# Patient Record
Sex: FEMALE | Race: BLACK OR AFRICAN AMERICAN | URBAN - METROPOLITAN AREA
[De-identification: names, ages, dates, MRNs, and addresses within clinical notes are randomized per-mention and may not be internally consistent; named-entity substitution may affect disease eponyms.]

---

## 2024-07-30 ENCOUNTER — APPOINTMENT (OUTPATIENT)
Dept: LAB | Facility: MEDICAL CENTER | Age: 49
End: 2024-07-30

## 2024-07-30 ENCOUNTER — APPOINTMENT (OUTPATIENT)
Dept: URGENT CARE | Facility: MEDICAL CENTER | Age: 49
End: 2024-07-30

## 2024-07-30 DIAGNOSIS — Z02.1 PHYSICAL EXAM, PRE-EMPLOYMENT: ICD-10-CM

## 2024-07-30 LAB
MEV IGG SER QL IA: NORMAL
MUV IGG SER QL IA: NORMAL
RUBV IGG SERPL IA-ACNC: 25.5 IU/ML
VZV IGG SER QL IA: NORMAL

## 2024-07-30 PROCEDURE — 86787 VARICELLA-ZOSTER ANTIBODY: CPT

## 2024-07-30 PROCEDURE — 86765 RUBEOLA ANTIBODY: CPT

## 2024-07-30 PROCEDURE — 36415 COLL VENOUS BLD VENIPUNCTURE: CPT

## 2024-07-30 PROCEDURE — 86762 RUBELLA ANTIBODY: CPT

## 2024-07-30 PROCEDURE — 86735 MUMPS ANTIBODY: CPT

## 2024-12-24 ENCOUNTER — APPOINTMENT (OUTPATIENT)
Age: 49
End: 2024-12-24
Payer: COMMERCIAL

## 2024-12-24 ENCOUNTER — RESULTS FOLLOW-UP (OUTPATIENT)
Age: 49
End: 2024-12-24

## 2024-12-24 ENCOUNTER — OFFICE VISIT (OUTPATIENT)
Age: 49
End: 2024-12-24
Payer: COMMERCIAL

## 2024-12-24 ENCOUNTER — TELEPHONE (OUTPATIENT)
Dept: ADMINISTRATIVE | Facility: OTHER | Age: 49
End: 2024-12-24

## 2024-12-24 VITALS
OXYGEN SATURATION: 98 % | TEMPERATURE: 97.8 F | HEART RATE: 78 BPM | HEIGHT: 65 IN | DIASTOLIC BLOOD PRESSURE: 80 MMHG | WEIGHT: 262.8 LBS | BODY MASS INDEX: 43.78 KG/M2 | RESPIRATION RATE: 16 BRPM | SYSTOLIC BLOOD PRESSURE: 130 MMHG

## 2024-12-24 DIAGNOSIS — Z13.6 SCREENING FOR CARDIOVASCULAR CONDITION: ICD-10-CM

## 2024-12-24 DIAGNOSIS — R07.89 CHEST TIGHTNESS: ICD-10-CM

## 2024-12-24 DIAGNOSIS — Z00.00 ANNUAL PHYSICAL EXAM: ICD-10-CM

## 2024-12-24 DIAGNOSIS — Z12.11 SCREENING FOR COLORECTAL CANCER: ICD-10-CM

## 2024-12-24 DIAGNOSIS — Z12.4 SCREENING FOR CERVICAL CANCER: ICD-10-CM

## 2024-12-24 DIAGNOSIS — R51.9 NONINTRACTABLE EPISODIC HEADACHE, UNSPECIFIED HEADACHE TYPE: ICD-10-CM

## 2024-12-24 DIAGNOSIS — R42 EPISODIC LIGHTHEADEDNESS: ICD-10-CM

## 2024-12-24 DIAGNOSIS — Z12.31 ENCOUNTER FOR SCREENING MAMMOGRAM FOR BREAST CANCER: ICD-10-CM

## 2024-12-24 DIAGNOSIS — Z12.12 SCREENING FOR COLORECTAL CANCER: ICD-10-CM

## 2024-12-24 DIAGNOSIS — Z00.00 ANNUAL PHYSICAL EXAM: Primary | ICD-10-CM

## 2024-12-24 PROBLEM — D57.3 SICKLE CELL TRAIT (HCC): Status: ACTIVE | Noted: 2024-12-24

## 2024-12-24 PROBLEM — D57.3 SICKLE CELL TRAIT (HCC): Status: RESOLVED | Noted: 2024-12-24 | Resolved: 2024-12-24

## 2024-12-24 LAB
ALBUMIN SERPL BCG-MCNC: 4.5 G/DL (ref 3.5–5)
ALP SERPL-CCNC: 42 U/L (ref 34–104)
ALT SERPL W P-5'-P-CCNC: 14 U/L (ref 7–52)
ANION GAP SERPL CALCULATED.3IONS-SCNC: 9 MMOL/L (ref 4–13)
AST SERPL W P-5'-P-CCNC: 17 U/L (ref 13–39)
BASOPHILS # BLD AUTO: 0.04 THOUSANDS/ÂΜL (ref 0–0.1)
BASOPHILS NFR BLD AUTO: 1 % (ref 0–1)
BILIRUB SERPL-MCNC: 0.27 MG/DL (ref 0.2–1)
BUN SERPL-MCNC: 15 MG/DL (ref 5–25)
CALCIUM SERPL-MCNC: 9.7 MG/DL (ref 8.4–10.2)
CHLORIDE SERPL-SCNC: 99 MMOL/L (ref 96–108)
CHOLEST SERPL-MCNC: 179 MG/DL (ref ?–200)
CO2 SERPL-SCNC: 28 MMOL/L (ref 21–32)
CREAT SERPL-MCNC: 0.71 MG/DL (ref 0.6–1.3)
EOSINOPHIL # BLD AUTO: 0.18 THOUSAND/ÂΜL (ref 0–0.61)
EOSINOPHIL NFR BLD AUTO: 4 % (ref 0–6)
ERYTHROCYTE [DISTWIDTH] IN BLOOD BY AUTOMATED COUNT: 16.1 % (ref 11.6–15.1)
FERRITIN SERPL-MCNC: 13 NG/ML (ref 11–307)
GFR SERPL CREATININE-BSD FRML MDRD: 101 ML/MIN/1.73SQ M
GLUCOSE P FAST SERPL-MCNC: 99 MG/DL (ref 65–99)
HCT VFR BLD AUTO: 37.6 % (ref 34.8–46.1)
HDLC SERPL-MCNC: 49 MG/DL
HGB BLD-MCNC: 12.1 G/DL (ref 11.5–15.4)
IMM GRANULOCYTES # BLD AUTO: 0.01 THOUSAND/UL (ref 0–0.2)
IMM GRANULOCYTES NFR BLD AUTO: 0 % (ref 0–2)
IRON SATN MFR SERPL: 12 % (ref 15–50)
IRON SERPL-MCNC: 60 UG/DL (ref 50–212)
LDLC SERPL CALC-MCNC: 103 MG/DL (ref 0–100)
LYMPHOCYTES # BLD AUTO: 2.3 THOUSANDS/ÂΜL (ref 0.6–4.47)
LYMPHOCYTES NFR BLD AUTO: 46 % (ref 14–44)
MCH RBC QN AUTO: 24.7 PG (ref 26.8–34.3)
MCHC RBC AUTO-ENTMCNC: 32.2 G/DL (ref 31.4–37.4)
MCV RBC AUTO: 77 FL (ref 82–98)
MONOCYTES # BLD AUTO: 0.49 THOUSAND/ÂΜL (ref 0.17–1.22)
MONOCYTES NFR BLD AUTO: 10 % (ref 4–12)
NEUTROPHILS # BLD AUTO: 1.94 THOUSANDS/ÂΜL (ref 1.85–7.62)
NEUTS SEG NFR BLD AUTO: 39 % (ref 43–75)
NRBC BLD AUTO-RTO: 0 /100 WBCS
PLATELET # BLD AUTO: 236 THOUSANDS/UL (ref 149–390)
PMV BLD AUTO: 11.6 FL (ref 8.9–12.7)
POTASSIUM SERPL-SCNC: 3.9 MMOL/L (ref 3.5–5.3)
PROT SERPL-MCNC: 7.6 G/DL (ref 6.4–8.4)
RBC # BLD AUTO: 4.89 MILLION/UL (ref 3.81–5.12)
SODIUM SERPL-SCNC: 136 MMOL/L (ref 135–147)
T4 FREE SERPL-MCNC: 0.85 NG/DL (ref 0.61–1.12)
TIBC SERPL-MCNC: 483 UG/DL (ref 250–450)
TRANSFERRIN SERPL-MCNC: 345 MG/DL (ref 203–362)
TRIGL SERPL-MCNC: 137 MG/DL (ref ?–150)
TSH SERPL DL<=0.05 MIU/L-ACNC: 2.61 UIU/ML (ref 0.45–4.5)
UIBC SERPL-MCNC: 423 UG/DL (ref 155–355)
WBC # BLD AUTO: 4.96 THOUSAND/UL (ref 4.31–10.16)

## 2024-12-24 PROCEDURE — 83036 HEMOGLOBIN GLYCOSYLATED A1C: CPT

## 2024-12-24 PROCEDURE — 83540 ASSAY OF IRON: CPT

## 2024-12-24 PROCEDURE — 82728 ASSAY OF FERRITIN: CPT

## 2024-12-24 PROCEDURE — 85025 COMPLETE CBC W/AUTO DIFF WBC: CPT

## 2024-12-24 PROCEDURE — 83550 IRON BINDING TEST: CPT

## 2024-12-24 PROCEDURE — 99386 PREV VISIT NEW AGE 40-64: CPT

## 2024-12-24 PROCEDURE — 84439 ASSAY OF FREE THYROXINE: CPT

## 2024-12-24 PROCEDURE — 99214 OFFICE O/P EST MOD 30 MIN: CPT

## 2024-12-24 PROCEDURE — 36415 COLL VENOUS BLD VENIPUNCTURE: CPT

## 2024-12-24 PROCEDURE — 71046 X-RAY EXAM CHEST 2 VIEWS: CPT

## 2024-12-24 PROCEDURE — 84443 ASSAY THYROID STIM HORMONE: CPT

## 2024-12-24 PROCEDURE — 80061 LIPID PANEL: CPT

## 2024-12-24 PROCEDURE — 93000 ELECTROCARDIOGRAM COMPLETE: CPT

## 2024-12-24 PROCEDURE — 80053 COMPREHEN METABOLIC PANEL: CPT

## 2024-12-24 NOTE — PROGRESS NOTES
Adult Annual Physical  Name: Julia Dunn      : 1975      MRN: [id number]  Encounter Provider: Bettina Lundberg PA-C  Encounter Date: 2024   Encounter department: [facility] [place]    Assessment & Plan  Annual physical exam    Annual physical exam completed today.  - Medical history reviewed, including existing medical conditions, medications, and surgeries.   - Labs discussed to evaluate cholesterol, blood sugar, kidney function, liver function, and other important markers of health.  - BMI evaluated and discussed.  - Cancer screenings discussed: Mammogram/pap smear/colonoscopy.   - Vaccination status reviewed and pertinent immunizations and booster shots discussed.   - Bone health reviewed.   - Skin examination.  Discussed importance of sunscreen and other preventative measures for skin cancer.    - Lifestyle and health counseling completed including diet, exercise habits, smoking status, alcohol consumption.   - Mental health and wellbeing evaluated and discussed.  - Family history obtained to identify any of hereditary health risks.     Orders:  •  CBC and differential; Future  •  Comprehensive metabolic panel; Future    Chest tightness  Patient has had symptoms for the past couple weeks, denies any radiation of the pain, shortness of breath, or lightheadedness with the episodes.  No history of asthma or other respiratory illnesses.  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    EKG today shows sinus arrhythmia, non specific ST wave abnormality, and PVC.    Will check a TSH/T4 along with getting a chest x-ray to rule out respiratory cause.  Patient can use NSAIDs for the pain, low suspicion for GERD as it is not associated with eating.  Orders:  •  POCT ECG  •  XR chest pa and lateral; Future  •  TSH + Free T4; Future    Nonintractable episodic headache,  unspecified headache type  Headaches sound tension in nature, do not happen frequently.  Neuro exam WNL today.  Would recommend continuing to use Motrin 600 mg as needed.  Make sure she is eating regular meals and getting adequate sleep/rest.  She works overnights.  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       Episodic lightheadedness  Associated with her headaches.  It improves when she has something to eat or drink.  Possibly due to low blood sugar.  Will check an iron panel and A1c to rule out anemia and diabetes.  Orders:  •  Hemoglobin A1C; Future  •  Iron Panel (Includes Ferritin, Iron Sat%, Iron, and TIBC); Future    Encounter for screening mammogram for breast cancer  Not having any symptoms.  Will get a baseline mammogram.  Orders:  •  Mammo screening bilateral w 3d and cad; Future    Screening for cervical cancer  Needs establish with GYN.  Has a IUD placed a couple years ago.  Orders:  •  Ambulatory referral to Obstetrics / Gynecology; Future    Screening for colorectal cancer  Is overdue for colon cancer screening.  Patient agreeable for colonoscopy.  Orders:  •  Ambulatory referral to Gastroenterology; Future    Screening for cardiovascular condition  Will obtain a lipid panel to assess her cardiovascular risk.  She is a non-smoker, no significant family history.  Orders:  •  Lipid Panel with Direct LDL reflex; Future    Immunizations and preventive care screenings were discussed with patient today. Appropriate education was printed on patient's after visit summary.    Counseling:  Alcohol/drug use: discussed moderation in alcohol intake, the recommendations for healthy alcohol use, and avoidance of illicit drug use.  Dental Health: discussed importance of regular tooth brushing, flossing, and dental visits.  Injury prevention: discussed safety/seat belts, safety helmets, smoke detectors, carbon monoxide detectors, and smoking near bedding or  upholstery.  Sexual health: discussed sexually transmitted diseases, partner selection, use of condoms, avoidance of unintended pregnancy, and contraceptive alternatives.  Exercise: the importance of regular exercise/physical activity was discussed. Recommend exercise 3-5 times per week for at least 30 minutes.       Depression Screening and Follow-up Plan: Patient was screened for depression during today's encounter. They screened negative with a PHQ-2 score of 0.        History of Present Illness     Adult Annual Physical:  Patient presents for annual physical.     Diet and Physical Activity:  - Diet/Nutrition: well balanced diet.  - Exercise: no formal exercise.    Depression Screening:  - PHQ-2 Score: 0    General Health:  - Sleep: sleeps well.  - Hearing: normal hearing bilateral ears.  - Vision: no vision problems.  - Dental: regular dental visits.    /GYN Health:  - Follows with GYN: yes.   - Menopause: premenopausal.   - History of STDs: no  - Contraception: IUD placement. July 2022 IUD placed, believes it is copper      Advanced Care Planning:  - Has an advanced directive?: no    - Has a durable medical POA?: no    - ACP document given to patient?: no        Pt is here for annual physical, establish care with this office, and a couple of concerns.    PT has been having tightness in her chest.  She was just walking at the time.  She denies trauma.  Started about 2 weeks ago.  She does have some pain when she pushes on her upper chest.  Denies palpitations, SOB, GERD, jaw or arm pain.  Denies recent viral illness.  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  No hx asthma, smoking or 2nd hand smoke, or hx of respiratory illness.  She did have a cough of unknown cause when she lived in [place] and everything was negative.  She had a TB test in July for employment with [employer] and this was negative.    Pt reports increased headaches, but states it is less than once a week.  She uses motrin which  "helps.  She hasn't been sleeping well recently, but she is also trying to hydrate more.  She feels a little LHN with improvement after eating or drinking something.  She went to the ER in April 2023 for the headaches, had a CT scan which was negative, and was diagnosed with tension headaches.    She had a vision exam recently.  Needs to establish with GYN.  Has IUD, she believes she has a copper one because she was told to change it in ten years.  Wants to have colonoscopy done for screening.  Reviewed family history.    Review of Systems   Constitutional: Negative.    HENT: Negative.     Respiratory:  Negative for cough and shortness of breath.    Cardiovascular:  Negative for chest pain.   Gastrointestinal: Negative.    Genitourinary: Negative.    Musculoskeletal:  Negative for gait problem.   Skin:  Negative for rash.   Neurological:  Positive for headaches. Negative for syncope and light-headedness.   All other systems reviewed and are negative.        Objective   /80 (BP Location: Left arm, Patient Position: Sitting, Cuff Size: Standard)   Pulse 78   Temp 97.8 °F (36.6 °C) (Tympanic)   Resp 16   Ht 5' 5.25\" (1.657 m)   Wt 119 kg (262 lb 12.8 oz)   SpO2 98%   BMI 43.40 kg/m²     Physical Exam  Constitutional:       General: She is not in acute distress.     Appearance: Normal appearance. She is morbidly obese. She is not toxic-appearing.   HENT:      Head: Normocephalic and atraumatic.      Right Ear: Hearing, tympanic membrane, ear canal and external ear normal.      Left Ear: Hearing, tympanic membrane, ear canal and external ear normal.      Nose: Nose normal.      Mouth/Throat:      Lips: Pink.      Mouth: Mucous membranes are moist. No oral lesions.      Dentition: Normal dentition.      Pharynx: Oropharynx is clear.   Eyes:      General: Lids are normal. Vision grossly intact. No visual field deficit or scleral icterus.     Extraocular Movements: Extraocular movements intact.      " Conjunctiva/sclera: Conjunctivae normal.      Pupils: Pupils are equal, round, and reactive to light.      Visual Fields:      Right eye: CF in the upper temporal quadrant. CF in the upper nasal quadrant. CF in the lower temporal quadrant. CF in the lower nasal quadrant.      Left eye: CF in the upper nasal quadrant. CF in the upper temporal quadrant. CF in the lower nasal quadrant. CF in the lower temporal quadrant.   Neck:      Thyroid: No thyroid mass, thyromegaly or thyroid tenderness.      Vascular: No carotid bruit.      Meningeal: Brudzinski's sign and Kernig's sign absent.   Cardiovascular:      Rate and Rhythm: Normal rate and regular rhythm.      Pulses:           Radial pulses are 2+ on the right side and 2+ on the left side.        Posterior tibial pulses are 2+ on the right side and 2+ on the left side.      Heart sounds: Normal heart sounds.   Pulmonary:      Effort: Pulmonary effort is normal. No respiratory distress.      Breath sounds: Normal breath sounds.   Abdominal:      General: Bowel sounds are normal. There is no distension.      Palpations: Abdomen is soft.      Tenderness: There is no abdominal tenderness.   Musculoskeletal:      Cervical back: Normal range of motion and neck supple.      Right lower leg: No edema.      Left lower leg: No edema.      Comments: Patient with erect posture and good balance with normal gait while walking.  Joints and muscles are symmetrical no swelling, redness, or deformity.  Patient has active range of motion of all joints.     Lymphadenopathy:      Cervical: No cervical adenopathy.   Skin:     General: Skin is warm and dry.      Coloration: Skin is not jaundiced.   Neurological:      Mental Status: She is alert and oriented to person, place, and time.      GCS: GCS eye subscore is 4. GCS verbal subscore is 5. GCS motor subscore is 6.      Cranial Nerves: Cranial nerves 2-12 are intact. No cranial nerve deficit, dysarthria or facial asymmetry.      Sensory:  No sensory deficit.      Motor: No weakness or tremor.      Coordination: Coordination normal. Finger-Nose-Finger Test and Heel to Shin Test normal.      Gait: Gait is intact.   Psychiatric:         Behavior: Behavior is cooperative.

## 2024-12-24 NOTE — TELEPHONE ENCOUNTER
----- Message from Bettina Lundberg PA-C sent at 12/24/2024  9:02 AM EST -----  Regarding: care gap request  12/24/24 9:02 AM    Hello, our patient attached above has had Immunization(s) completed/performed. Please assist in updating the patient chart by making an External outreach to [facility] facility during her preemployment physical.  Pt believes she had her TDaP done at that time. The date of service is July 2024.    Thank you,  Bettina Lundberg PA-C   PRIMARY CARE [place]

## 2024-12-24 NOTE — PATIENT INSTRUCTIONS
"Patient Education     Routine physical for adults   The Basics   Written by the doctors and editors at [facility]   What is a physical? -- A physical is a routine visit, or \"check-up,\" with your doctor. You might also hear it called a \"wellness visit\" or \"preventive visit.\"  During each visit, the doctor will:   Ask about your physical and mental health   Ask about your habits, behaviors, and lifestyle   Do an exam   Give you vaccines if needed   Talk to you about any medicines you take   Give advice about your health   Answer your questions  Getting regular check-ups is an important part of taking care of your health. It can help your doctor find and treat any problems you have. But it's also important for preventing health problems.  A routine physical is different from a \"sick visit.\" A sick visit is when you see a doctor because of a health concern or problem. Since physicals are scheduled ahead of time, you can think about what you want to ask the doctor.  How often should I get a physical? -- It depends on your age and health. In general, for people age 21 years and older:   If you are younger than 50 years, you might be able to get a physical every 3 years.   If you are 50 years or older, your doctor might recommend a physical every year.  If you have an ongoing health condition, like diabetes or high blood pressure, your doctor will probably want to see you more often.  What happens during a physical? -- In general, each visit will include:   Physical exam - The doctor or nurse will check your height, weight, heart rate, and blood pressure. They will also look at your eyes and ears. They will ask about how you are feeling and whether you have any symptoms that bother you.   Medicines - It's a good idea to bring a list of all the medicines you take to each doctor visit. Your doctor will talk to you about your medicines and answer any questions. Tell them if you are having any side effects that bother you. You " "should also tell them if you are having trouble paying for any of your medicines.   Habits and behaviors - This includes:   Your diet   Your exercise habits   Whether you smoke, drink alcohol, or use drugs   Whether you are sexually active   Whether you feel safe at home  Your doctor will talk to you about things you can do to improve your health and lower your risk of health problems. They will also offer help and support. For example, if you want to quit smoking, they can give you advice and might prescribe medicines. If you want to improve your diet or get more physical activity, they can help you with this, too.   Lab tests, if needed - The tests you get will depend on your age and situation. For example, your doctor might want to check your:   Cholesterol   Blood sugar   Iron level   Vaccines - The recommended vaccines will depend on your age, health, and what vaccines you already had. Vaccines are very important because they can prevent certain serious or deadly infections.   Discussion of screening - \"Screening\" means checking for diseases or other health problems before they cause symptoms. Your doctor can recommend screening based on your age, risk, and preferences. This might include tests to check for:   Cancer, such as breast, prostate, cervical, ovarian, colorectal, prostate, lung, or skin cancer   Sexually transmitted infections, such as chlamydia and gonorrhea   Mental health conditions like depression and anxiety  Your doctor will talk to you about the different types of screening tests. They can help you decide which screenings to have. They can also explain what the results might mean.   Answering questions - The physical is a good time to ask the doctor or nurse questions about your health. If needed, they can refer you to other doctors or specialists, too.  Adults older than 65 years often need other care, too. As you get older, your doctor will talk to you about:   How to prevent falling at " home   Hearing or vision tests   Memory testing   How to take your medicines safely   Making sure that you have the help and support you need at home  All topics are updated as new evidence becomes available and our peer review process is complete.  This topic retrieved from [employer] on: May 02, 2024.  Topic 222351 Version 1.0  Release: 32.4.3 - C32.122  © 2024 UpToDate, Inc. and/or its affiliates. All rights reserved.  Consumer Information Use and Disclaimer   Disclaimer: This generalized information is a limited summary of diagnosis, treatment, and/or medication information. It is not meant to be comprehensive and should be used as a tool to help the user understand and/or assess potential diagnostic and treatment options. It does NOT include all information about conditions, treatments, medications, side effects, or risks that may apply to a specific patient. It is not intended to be medical advice or a substitute for the medical advice, diagnosis, or treatment of a health care provider based on the health care provider's examination and assessment of a patient's specific and unique circumstances. Patients must speak with a health care provider for complete information about their health, medical questions, and treatment options, including any risks or benefits regarding use of medications. This information does not endorse any treatments or medications as safe, effective, or approved for treating a specific patient. UpToDate, Inc. and its affiliates disclaim any warranty or liability relating to this information or the use thereof.The use of this information is governed by the Terms of Use, available at https://www.[employer]olor Technologiesuwer.com/en/know/clinical-effectiveness-terms. 2024© UpToDate, Inc. and its affiliates and/or licensors. All rights reserved.  Copyright   © 2024 UpToDate, Inc. and/or its affiliates. All rights reserved.

## 2024-12-25 LAB
EST. AVERAGE GLUCOSE BLD GHB EST-MCNC: 128 MG/DL
HBA1C MFR BLD: 6.1 %

## 2025-01-02 ENCOUNTER — TELEPHONE (OUTPATIENT)
Age: 50
End: 2025-01-02

## 2025-01-02 ENCOUNTER — PREP FOR PROCEDURE (OUTPATIENT)
Age: 50
End: 2025-01-02

## 2025-01-02 DIAGNOSIS — Z12.11 SCREENING FOR COLON CANCER: Primary | ICD-10-CM

## 2025-01-02 NOTE — TELEPHONE ENCOUNTER
Scheduled date of colonoscopy (as of today):  1/16/25    Physician performing colonoscopy: Dr. Arreola    Location of colonoscopy:  [place]    Bowel prep reviewed with patient: MITCHELL/ROSA Nash

## 2025-01-02 NOTE — TELEPHONE ENCOUNTER
01/02/25  Screened by: Maryann Jimenez    Referring Provider Bettina Lundberg PA-C    Pre- Screening:     There is no height or weight on file to calculate BMI.  43.40  Has patient been referred for a routine screening Colonoscopy? yes  Is the patient between 45-75 years old? yes      Previous Colonoscopy no   If yes:    Date:     Facility:     Reason:         Does the patient want to see a Gastroenterologist prior to their procedure OR are they having any GI symptoms? no    Has the patient been hospitalized or had abdominal surgery in the past 6 months? no    Does the patient use supplemental oxygen? no    Does the patient take Coumadin, Lovenox, Plavix, Elliquis, Xarelto, or other blood thinning medication? no    Has the patient had a stroke, cardiac event, or stent placed in the past year? no      If patient is between 45yrs - 49yrs, please advise patient that we will have to confirm benefits & coverage with their insurance company for a routine screening colonoscopy.

## 2025-01-14 ENCOUNTER — PREP FOR PROCEDURE (OUTPATIENT)
Age: 50
End: 2025-01-14

## 2025-01-14 ENCOUNTER — HOSPITAL ENCOUNTER (OUTPATIENT)
Age: 50
Discharge: HOME | End: 2025-01-14
Payer: COMMERCIAL

## 2025-01-14 VITALS — HEIGHT: 65 IN | BODY MASS INDEX: 43.65 KG/M2 | WEIGHT: 262 LBS

## 2025-01-14 DIAGNOSIS — Z12.31 ENCOUNTER FOR SCREENING MAMMOGRAM FOR BREAST CANCER: ICD-10-CM

## 2025-01-14 PROCEDURE — 77067 SCR MAMMO BI INCL CAD: CPT

## 2025-01-14 PROCEDURE — 77063 BREAST TOMOSYNTHESIS BI: CPT

## 2025-01-14 NOTE — TELEPHONE ENCOUNTER
Upon review of the In Basket request we have noted this is a [employer] Employee. [facility] Employee Health Department will not release Employee Records to anyone but the Employee/Patient. The immunization record(s) requested will need to be requested and provided by the Employee/Patient. Once received the office is able to complete the update by following the historical immunization workflow.     Any additional questions or concerns should be emailed to the Practice Liaisons via the appropriate education email address, please do not reply via In Basket.    Thank you  Lyle Blair MA   [facility]

## 2025-01-16 ENCOUNTER — HOSPITAL ENCOUNTER (OUTPATIENT)
Dept: GASTROENTEROLOGY | Facility: HOSPITAL | Age: 50
Setting detail: OUTPATIENT SURGERY
End: 2025-01-16
Attending: STUDENT IN AN ORGANIZED HEALTH CARE EDUCATION/TRAINING PROGRAM
Payer: COMMERCIAL

## 2025-01-16 ENCOUNTER — ANESTHESIA (OUTPATIENT)
Dept: GASTROENTEROLOGY | Facility: HOSPITAL | Age: 50
End: 2025-01-16
Payer: COMMERCIAL

## 2025-01-16 ENCOUNTER — RESULTS FOLLOW-UP (OUTPATIENT)
Age: 50
End: 2025-01-16

## 2025-01-16 ENCOUNTER — ANESTHESIA EVENT (OUTPATIENT)
Dept: GASTROENTEROLOGY | Facility: HOSPITAL | Age: 50
End: 2025-01-16
Payer: COMMERCIAL

## 2025-01-16 VITALS
BODY MASS INDEX: 41.81 KG/M2 | WEIGHT: 260.14 LBS | DIASTOLIC BLOOD PRESSURE: 56 MMHG | SYSTOLIC BLOOD PRESSURE: 122 MMHG | RESPIRATION RATE: 16 BRPM | HEART RATE: 73 BPM | OXYGEN SATURATION: 100 % | TEMPERATURE: 97.6 F | HEIGHT: 66 IN

## 2025-01-16 DIAGNOSIS — Z12.11 SCREENING FOR COLON CANCER: ICD-10-CM

## 2025-01-16 PROBLEM — E66.813 OBESITY, CLASS III, BMI 40-49.9 (MORBID OBESITY): Status: ACTIVE | Noted: 2025-01-16

## 2025-01-16 PROBLEM — E66.01 OBESITY, CLASS III, BMI 40-49.9 (MORBID OBESITY) (HCC): Status: ACTIVE | Noted: 2025-01-16

## 2025-01-16 LAB
EXT PREGNANCY TEST URINE: NEGATIVE
EXT. CONTROL: NORMAL

## 2025-01-16 PROCEDURE — 81025 URINE PREGNANCY TEST: CPT | Performed by: ANESTHESIOLOGY

## 2025-01-16 PROCEDURE — 88305 TISSUE EXAM BY PATHOLOGIST: CPT | Performed by: PATHOLOGY

## 2025-01-16 PROCEDURE — 45380 COLONOSCOPY AND BIOPSY: CPT | Performed by: INTERNAL MEDICINE

## 2025-01-16 RX ORDER — LIDOCAINE HYDROCHLORIDE 10 MG/ML
INJECTION, SOLUTION EPIDURAL; INFILTRATION; INTRACAUDAL; PERINEURAL AS NEEDED
Status: DISCONTINUED | OUTPATIENT
Start: 2025-01-16 | End: 2025-01-16

## 2025-01-16 RX ORDER — SODIUM CHLORIDE, SODIUM LACTATE, POTASSIUM CHLORIDE, CALCIUM CHLORIDE 600; 310; 30; 20 MG/100ML; MG/100ML; MG/100ML; MG/100ML
INJECTION, SOLUTION INTRAVENOUS CONTINUOUS PRN
Status: DISCONTINUED | OUTPATIENT
Start: 2025-01-16 | End: 2025-01-16

## 2025-01-16 RX ORDER — LIDOCAINE HYDROCHLORIDE 10 MG/ML
0.5 INJECTION, SOLUTION EPIDURAL; INFILTRATION; INTRACAUDAL; PERINEURAL ONCE AS NEEDED
Status: DISCONTINUED | OUTPATIENT
Start: 2025-01-16 | End: 2025-01-20 | Stop reason: HOSPADM

## 2025-01-16 RX ORDER — LIDOCAINE HYDROCHLORIDE 10 MG/ML
0.5 INJECTION, SOLUTION EPIDURAL; INFILTRATION; INTRACAUDAL; PERINEURAL ONCE AS NEEDED
Status: CANCELLED | OUTPATIENT
Start: 2025-01-16

## 2025-01-16 RX ORDER — SODIUM CHLORIDE, SODIUM LACTATE, POTASSIUM CHLORIDE, CALCIUM CHLORIDE 600; 310; 30; 20 MG/100ML; MG/100ML; MG/100ML; MG/100ML
50 INJECTION, SOLUTION INTRAVENOUS CONTINUOUS
Status: CANCELLED | OUTPATIENT
Start: 2025-01-16

## 2025-01-16 RX ORDER — PROPOFOL 10 MG/ML
INJECTION, EMULSION INTRAVENOUS AS NEEDED
Status: DISCONTINUED | OUTPATIENT
Start: 2025-01-16 | End: 2025-01-16

## 2025-01-16 RX ORDER — SODIUM CHLORIDE, SODIUM LACTATE, POTASSIUM CHLORIDE, CALCIUM CHLORIDE 600; 310; 30; 20 MG/100ML; MG/100ML; MG/100ML; MG/100ML
50 INJECTION, SOLUTION INTRAVENOUS CONTINUOUS
Status: DISCONTINUED | OUTPATIENT
Start: 2025-01-16 | End: 2025-01-20 | Stop reason: HOSPADM

## 2025-01-16 RX ADMIN — SODIUM CHLORIDE, SODIUM LACTATE, POTASSIUM CHLORIDE, AND CALCIUM CHLORIDE 50 ML/HR: .6; .31; .03; .02 INJECTION, SOLUTION INTRAVENOUS at 07:49

## 2025-01-16 RX ADMIN — PROPOFOL 20 MG: 10 INJECTION, EMULSION INTRAVENOUS at 09:15

## 2025-01-16 RX ADMIN — PROPOFOL 20 MG: 10 INJECTION, EMULSION INTRAVENOUS at 09:03

## 2025-01-16 RX ADMIN — PROPOFOL 100 MG: 10 INJECTION, EMULSION INTRAVENOUS at 09:02

## 2025-01-16 RX ADMIN — PROPOFOL 30 MG: 10 INJECTION, EMULSION INTRAVENOUS at 09:09

## 2025-01-16 RX ADMIN — PROPOFOL 30 MG: 10 INJECTION, EMULSION INTRAVENOUS at 09:14

## 2025-01-16 RX ADMIN — PROPOFOL 30 MG: 10 INJECTION, EMULSION INTRAVENOUS at 09:06

## 2025-01-16 RX ADMIN — SODIUM CHLORIDE, SODIUM LACTATE, POTASSIUM CHLORIDE, AND CALCIUM CHLORIDE: .6; .31; .03; .02 INJECTION, SOLUTION INTRAVENOUS at 08:57

## 2025-01-16 RX ADMIN — PROPOFOL 30 MG: 10 INJECTION, EMULSION INTRAVENOUS at 09:12

## 2025-01-16 RX ADMIN — LIDOCAINE HYDROCHLORIDE 50 MG: 10 INJECTION, SOLUTION EPIDURAL; INFILTRATION; INTRACAUDAL; PERINEURAL at 09:02

## 2025-01-16 RX ADMIN — PROPOFOL 20 MG: 10 INJECTION, EMULSION INTRAVENOUS at 09:07

## 2025-01-16 NOTE — H&P
History and Physical -  [facility]  Julia Dunn 49 y.o. female MRN: [id number]                  HPI: Julia Dunn is a 49 y.o. year old female who presents for colonoscopy for initial average risk screening      REVIEW OF SYSTEMS: Per the HPI, and otherwise unremarkable.    Historical Information   No past medical history on file.  Past Surgical History:   Procedure Laterality Date    APPENDECTOMY  1993     Social History   Social History     Substance and Sexual Activity   Alcohol Use Never     Social History     Substance and Sexual Activity   Drug Use Never     Social History     Tobacco Use   Smoking Status Never   Smokeless Tobacco Never     Family History   Problem Relation Age of Onset    No Known Problems Mother     No Known Problems Father     No Known Problems Sister     No Known Problems Sister     No Known Problems Daughter     No Known Problems Daughter     No Known Problems Maternal Grandmother     No Known Problems Maternal Grandfather     No Known Problems Paternal Grandmother     No Known Problems Paternal Grandfather     Sickle cell trait Son     No Known Problems Maternal Aunt     No Known Problems Paternal Aunt     Stroke Neg Hx     Heart disease Neg Hx     Cancer Neg Hx        Meds/Allergies     Not in a hospital admission.    No Known Allergies    Objective     Last menstrual period 01/13/2025.      PHYSICAL EXAM    Gen: NAD  CV: RRR  CHEST: Clear  ABD: soft, NT/ND  EXT: no edema  Neuro: AAO      ASSESSMENT/PLAN:  This is a 49 y.o. year old female here for initial average risk screening    PLAN:   Procedure: Colonoscopy

## 2025-01-16 NOTE — ANESTHESIA PREPROCEDURE EVALUATION
Procedure:  COLONOSCOPY    Relevant Problems   Other   (+) Obesity, Class III, BMI 40-49.9 (morbid obesity) (HCC)      Denies recent fever, cough or other symptom of upper respiratory tract infection.    Confirmed NPO appropriate    Physical Exam    Airway    Mallampati score: III  TM Distance: >3 FB  Neck ROM: full     Dental       Cardiovascular      Pulmonary      Other Findings  post-pubertal.      Anesthesia Plan  ASA Score- 2     Anesthesia Type- IV sedation with anesthesia with ASA Monitors.         Additional Monitors:     Airway Plan:            Plan Factors-Exercise tolerance (METS): >4 METS.    Chart reviewed.   Existing labs reviewed.     Patient is not a current smoker.  Patient did not smoke on day of surgery.            Induction- intravenous.    Postoperative Plan-         Informed Consent- Anesthetic plan and risks discussed with patient.        NPO Status:  Vitals Value Taken Time   Date of last liquid 01/16/25 01/16/25 0742   Time of last liquid 0300 01/16/25 0742   Date of last solid 01/14/25 01/16/25 0742   Time of last solid 1900 01/16/25 0742

## 2025-01-16 NOTE — INTERVAL H&P NOTE
H&P reviewed. After examining the patient I find no changes in the patients condition since the H&P had been written.    Vitals:    01/16/25 0738   BP: 136/67   Pulse: 69   Resp: 22   Temp: 97.7 °F (36.5 °C)   SpO2: 97%

## 2025-01-16 NOTE — ANESTHESIA POSTPROCEDURE EVALUATION
Post-Op Assessment Note    CV Status:  Stable  Pain Score: 0    Pain management: adequate       Mental Status:  Alert and awake   Hydration Status:  Euvolemic   PONV Controlled:  Controlled   Airway Patency:  Patent     Post Op Vitals Reviewed: Yes    No anethesia notable event occurred.    Staff: CRNA           Last Filed PACU Vitals:  Vitals Value Taken Time   Temp 97.4    Pulse 70    /58    Resp 14    SpO2 98

## 2025-01-21 PROCEDURE — 88305 TISSUE EXAM BY PATHOLOGIST: CPT | Performed by: PATHOLOGY

## 2025-01-22 ENCOUNTER — RESULTS FOLLOW-UP (OUTPATIENT)
Age: 50
End: 2025-01-22

## 2025-01-22 ENCOUNTER — HOSPITAL ENCOUNTER (OUTPATIENT)
Dept: ULTRASOUND IMAGING | Facility: CLINIC | Age: 50
Discharge: HOME | End: 2025-01-22
Payer: COMMERCIAL

## 2025-01-22 DIAGNOSIS — R92.8 ABNORMAL MAMMOGRAM: ICD-10-CM

## 2025-01-22 PROCEDURE — 76642 ULTRASOUND BREAST LIMITED: CPT

## 2025-03-19 ENCOUNTER — CONSULT (OUTPATIENT)
Age: 50
End: 2025-03-19
Payer: COMMERCIAL

## 2025-03-19 VITALS
SYSTOLIC BLOOD PRESSURE: 122 MMHG | WEIGHT: 268 LBS | BODY MASS INDEX: 43.07 KG/M2 | HEIGHT: 66 IN | DIASTOLIC BLOOD PRESSURE: 86 MMHG

## 2025-03-19 DIAGNOSIS — E66.01 OBESITY, CLASS III, BMI 40-49.9 (MORBID OBESITY) (HCC): ICD-10-CM

## 2025-03-19 DIAGNOSIS — Z30.431 CONTRACEPTIVE, SURVEILLANCE, INTRAUTERINE DEVICE: ICD-10-CM

## 2025-03-19 DIAGNOSIS — N92.6 IRREGULAR MENSES: ICD-10-CM

## 2025-03-19 DIAGNOSIS — Z12.4 SCREENING FOR CERVICAL CANCER: ICD-10-CM

## 2025-03-19 DIAGNOSIS — N89.8 VAGINAL ITCHING: ICD-10-CM

## 2025-03-19 DIAGNOSIS — Z01.419 ENCOUNTER FOR ANNUAL ROUTINE GYNECOLOGICAL EXAMINATION: Primary | ICD-10-CM

## 2025-03-19 PROCEDURE — S0610 ANNUAL GYNECOLOGICAL EXAMINA: HCPCS

## 2025-03-19 PROCEDURE — 87480 CANDIDA DNA DIR PROBE: CPT

## 2025-03-19 PROCEDURE — 87510 GARDNER VAG DNA DIR PROBE: CPT

## 2025-03-19 PROCEDURE — 87660 TRICHOMONAS VAGIN DIR PROBE: CPT

## 2025-03-19 NOTE — PROGRESS NOTES
Name: Julia Dunn      : 1975      MRN: [id number]  Encounter Provider: Monae Cade PA-C  Encounter Date: 3/19/2025   Encounter department: [facility] [place]  :  Assessment & Plan  Encounter for annual routine gynecological examination    Calcium 1000 mg (in divided doses-max 600 mg at one time) + 600-1000 IU Vit D daily.   Annual mammogram ordered.  Monthly breast self exam encouraged.  Call your insurance company to verify coverage prior to completing any ordered tests.   Exercise 150-300 minutes per week minimum.   Colon cancer screening-   Kegels 20 times twice daily. Use silicone based lubricant with sex as needed. (Water based only for use with condoms or sexual toys.)  Return to office in one year or sooner, if needed.      Contraceptive, surveillance, intrauterine device     Patient currently has paraguard IUD  Patient would like to switch to mirena IUD    Irregular menses  Patient would like a mirena IUD. Patient was educated on the risks/benefits of the mirena IUD and the insertion/removal process  Patient requesting pelvic U/S to check for fibroids due to her recent change in menses. Pelvic U/S ordered    Orders:    US pelvis complete w transvaginal; Future    Vaginal itching     Vaginosis swab ordered  Management pending results    Obesity, Class III, BMI 40-49.9 (morbid obesity) (HCC)    We discussed in detail diet and exercise recommendations for weight loss  Consider weight management referral     Screening for cervical cancer  Pap was not collected at this visit.  Please collect pap at IUD insertion visit  Orders:    Ambulatory referral to Obstetrics / Gynecology        History of Present Illness   HPI  Julia Dunn is a 49 y.o. female who presents for annual gyn     all   LMP does not recall LMP  usually regular per pt but 3 months ago she had her menses twice in one month, lasts 5 days.   Vasomotor symptoms  "No  Sexually active not currently Patient's  lives in [place].   Birth control  Yes, patient believes that she has a copper IUD placed in  in [place] per patient, patient would like to consider switching to mirena due to heavy menses  History of abnormal pap: No  Last pap Not on file   Self breast exam yes  Mammogram 2025  left breast mass, right breast bi-rads 2 LTC 10.17%. Follow up left breast diagnostic US showed lymph node bi-rads 2 Next Mammogram  already ordered/scheduled  Colon Cancer screenin2025 , type colonoscopy , follow up 5 years    Patient works as a PCA in the [employer]     Patient notes vaginal discharge, itching, no odor. She describes the color/consistency as \"normal\"    Hereditary Cancer Screening  FH Breast/Ovarian cancer: none  FH Uterine cancer: none  FH Colon cancer: none  Cancer-related family history is negative for Cancer, Breast cancer, and Colon cancer.    Review of Systems   Constitutional:  Negative for chills, fatigue, fever and unexpected weight change.   Gastrointestinal:  Negative for abdominal pain, anal bleeding, blood in stool, constipation, diarrhea, nausea and vomiting.   Genitourinary:  Negative for difficulty urinating, dyspareunia, dysuria, frequency, hematuria, menstrual problem, pelvic pain, urgency, vaginal bleeding, vaginal discharge and vaginal pain.   Neurological:  Negative for dizziness, syncope, light-headedness and headaches.     No current outpatient medications on file prior to visit.     No current facility-administered medications on file prior to visit.      Social History     Tobacco Use    Smoking status: Never    Smokeless tobacco: Never    Tobacco comments:     No history   Vaping Use    Vaping status: Never Used   Substance and Sexual Activity    Alcohol use: Never    Drug use: Never    Sexual activity: Not Currently     Partners: Male     Birth control/protection: I.U.D.        Objective   /86   Ht 5' 5.5\" (1.664 " m)   Wt 122 kg (268 lb)   BMI 43.92 kg/m²      Physical Exam  Vitals and nursing note reviewed.   Constitutional:       General: She is not in acute distress.     Appearance: She is well-developed.   HENT:      Head: Normocephalic and atraumatic.   Eyes:      Extraocular Movements: Extraocular movements intact.   Pulmonary:      Effort: Pulmonary effort is normal.   Chest:   Breasts:     Right: No swelling, bleeding, inverted nipple, mass, nipple discharge, skin change or tenderness.      Left: No swelling, bleeding, inverted nipple, mass, nipple discharge, skin change or tenderness.   Abdominal:      Palpations: Abdomen is soft.      Tenderness: There is no abdominal tenderness.      Hernia: There is no hernia in the left inguinal area or right inguinal area.   Genitourinary:     General: Normal vulva.      Exam position: Lithotomy position.      Pubic Area: No rash.       Labia:         Right: No rash, tenderness or lesion.         Left: No rash, tenderness or lesion.       Urethra: No urethral pain or urethral swelling.      Vagina: No vaginal discharge, erythema, tenderness, bleeding or lesions.      Cervix: No cervical motion tenderness, discharge, friability, lesion, erythema or cervical bleeding.      Uterus: Not enlarged and not tender.       Adnexa:         Right: No mass, tenderness or fullness.          Left: No mass, tenderness or fullness.        Comments: IUD strings visualized  Lymphadenopathy:      Upper Body:      Right upper body: No supraclavicular, axillary or pectoral adenopathy.      Left upper body: No supraclavicular, axillary or pectoral adenopathy.      Lower Body: No right inguinal adenopathy. No left inguinal adenopathy.   Skin:     General: Skin is warm and dry.   Neurological:      Mental Status: She is alert.   Psychiatric:         Mood and Affect: Mood normal.         Behavior: Behavior normal.         Monae Cade PA-C

## 2025-03-19 NOTE — ASSESSMENT & PLAN NOTE
We discussed in detail diet and exercise recommendations for weight loss  Consider weight management referral

## 2025-03-20 ENCOUNTER — RESULTS FOLLOW-UP (OUTPATIENT)
Age: 50
End: 2025-03-20

## 2025-03-20 DIAGNOSIS — N76.0 BV (BACTERIAL VAGINOSIS): Primary | ICD-10-CM

## 2025-03-20 DIAGNOSIS — B96.89 BV (BACTERIAL VAGINOSIS): Primary | ICD-10-CM

## 2025-03-20 DIAGNOSIS — N83.202 CYST OF LEFT OVARY: ICD-10-CM

## 2025-03-20 LAB
CANDIDA RRNA VAG QL PROBE: NOT DETECTED
G VAGINALIS RRNA GENITAL QL PROBE: DETECTED
T VAGINALIS RRNA GENITAL QL PROBE: NOT DETECTED

## 2025-03-20 RX ORDER — METRONIDAZOLE 500 MG/1
500 TABLET ORAL 2 TIMES DAILY
Qty: 14 TABLET | Refills: 0 | Status: SHIPPED | OUTPATIENT
Start: 2025-03-20 | End: 2025-03-27

## 2025-04-10 ENCOUNTER — APPOINTMENT (OUTPATIENT)
Dept: LAB | Facility: HOSPITAL | Age: 50
End: 2025-04-10

## 2025-04-10 DIAGNOSIS — Z00.8 HEALTH EXAMINATION IN POPULATION SURVEY: ICD-10-CM

## 2025-04-10 LAB
CHOLEST SERPL-MCNC: 169 MG/DL (ref ?–200)
HDLC SERPL-MCNC: 57 MG/DL
LDLC SERPL CALC-MCNC: 97 MG/DL (ref 0–100)
NONHDLC SERPL-MCNC: 112 MG/DL
TRIGL SERPL-MCNC: 74 MG/DL (ref ?–150)

## 2025-04-10 PROCEDURE — 80061 LIPID PANEL: CPT

## 2025-04-10 PROCEDURE — 36415 COLL VENOUS BLD VENIPUNCTURE: CPT

## 2025-04-10 PROCEDURE — 83036 HEMOGLOBIN GLYCOSYLATED A1C: CPT

## 2025-04-11 LAB
EST. AVERAGE GLUCOSE BLD GHB EST-MCNC: 126 MG/DL
HBA1C MFR BLD: 6 %

## 2025-04-18 ENCOUNTER — HOSPITAL ENCOUNTER (OUTPATIENT)
Dept: ULTRASOUND IMAGING | Facility: HOSPITAL | Age: 50
End: 2025-04-18
Payer: COMMERCIAL

## 2025-04-18 DIAGNOSIS — N92.6 IRREGULAR MENSES: ICD-10-CM

## 2025-04-18 PROCEDURE — 76856 US EXAM PELVIC COMPLETE: CPT

## 2025-04-18 PROCEDURE — 76830 TRANSVAGINAL US NON-OB: CPT

## 2025-05-02 NOTE — TELEPHONE ENCOUNTER
I have called the patient and reviewed her ultrasound findings.  We discussed the etiologies of fibroids this may be contributing to her heavy menses.  We also discussed how perimenopause can cause a change in menses and copper IUDs can also increase the pain and heaviness of a menstrual cycle.  We discussed the difference the safety of amenorrhea on birth control versus not on birth control.  We reviewed all the options of management including birth control versus surgical options of management.  Patient would like to have her copper IUD removed and a Mirena IUD inserted.  If the Mirena IUD is not sufficiently treating her symptoms, then she would consider surgical options of management.  We also discussed her ovarian cyst findings and I advised her to do a repeat ultrasound in 6 months    Patient has an appointment on 5/22/2025 for an IUD insertion.  Patient is requesting to have an appointment sooner if possible, I discussed this with our clerical team and they will keep an eye out for open appointments to get her in sooner if possible.    Monae Cade PA-C

## 2025-05-21 NOTE — PROGRESS NOTES
IUD Procedure    Date/Time: 5/22/2025 11:40 AM    Performed by: MAGDI Madsen  Authorized by: MAGDI Madsen    Verbal consent obtained?: No    Written consent obtained?: Yes    Risks and benefits: Risks, benefits and alternatives were discussed    Consent given by:  Patient  Time Out:     Time out performed at:  5/22/2025 11:40 AM  Patient states understanding of procedure being performed: Yes    Patient's understanding of procedure matches consent: Yes    Procedure consent matches procedure scheduled: Yes    Relevant documents present and verified: Yes    Patient identity confirmed:  Verbally with patient  Select procedure: IUD removal and insertion    IUD Insertion:     Pelvic exam performed: yes      Negative urine pregnancy test: yes      Cervix cleaned and prepped: yes      Speculum placed in vagina: yes      Tenaculum applied to cervix: yes      IUD inserted with no complications: yes      Uterus sounded: yes      Uterus sound depth (cm):  12    IUD type:  1 each Levonorgestrel 20 MCG/DAY  Post-procedure:     Patient tolerated procedure well: yes      Patient will follow up after next period: yes    Insertion Comments:      Return in 3 months for menses recheck. All questions answered.  Discussed risks of IUD placement today which include perforation, embedment, infection, higher risk for ectopic pregnancy, bleeding etc. Patient will monitor for these and call with any concerns.      IUD Removal:     Reason for removal comment:  Hormonal IUD requested for menorrhagia    Removed without complications: yes      Tenaculum/Allis/Ring Forceps applied to cervix: no      Strings visualized: yes      IUD intact: yes      Performed with ultrasound guidance: no    Removal Comments:      Tolerated removal well. Reinsertion was done next. Pap was also done.

## 2025-05-21 NOTE — PATIENT INSTRUCTIONS
Patient Education     Levonorgestrel (IUD) (LC harmon)   Brand Names: US Kyleena; Liletta (52 MG); Mirena (52 MG); Kavita   Brand Names: Lyudmila Kyleena; Mirena   What is this drug used for?   It is used to prevent pregnancy.  It is used to treat heavy bleeding during monthly periods (menstruation).  What do I need to tell my doctor BEFORE I take this drug?   If you are allergic to this drug; any part of this drug; or any other drugs, foods, or substances. Tell your doctor about the allergy and what signs you had.  If you are pregnant or may be pregnant. Do not use this drug if you are pregnant.  If you have an IUD (intrauterine device) in place.  If you have any of these health problems: Active liver disease, chlamydia or gonorrhea, endometritis (including after a birth), genital tract infection, liver tumor, pelvic infection, uterine or cervical tumor or growth, uterine problems like uterine fibroids, or untreated cervicitis or vaginitis.  If you have had a pelvic infection within the past 3 months that happened before, during, or after an  or miscarriage.  If you have unexplained vaginal bleeding.  If you have ever had any of these health problems: Breast cancer, cancer where hormones make it grow, or pelvic inflammatory disease.  If you have a weak immune system, you use or abuse IV drugs, or you have a disease that may cause a weak immune system like HIV.  If you or your partner have sex with more than one person.  This is not a list of all drugs or health problems that interact with this drug.  Tell your doctor and pharmacist about all of your drugs (prescription or OTC, natural products, vitamins) and health problems. You must check to make sure that it is safe for you to take this drug with all of your drugs and health problems. Do not start, stop, or change the dose of any drug without checking with your doctor.  What are some things I need to know or do while I take this drug?   Tell all  of your health care providers that you take this drug. This includes your doctors, nurses, pharmacists, and dentists.  Ovarian cysts may rarely happen. Most of the time, these go back to normal in 2 to 3 months. Sometimes, ovarian cysts can cause pain and the need for surgery. Talk with the doctor.  If you get pregnant while taking this drug, the chance of pregnancy outside of the uterus (ectopic pregnancy) may be raised. Talk with your doctor.  This drug does not stop the spread of diseases like HIV or hepatitis that are passed through having sex. Do not have any kind of sex without using a latex or polyurethane condom. If you have questions, talk with your doctor.  If you are having an MRI, talk with your doctor.  Life-threatening infection can happen within a few days after this drug was put in. Call your doctor right away if you have fever or pain where this drug was placed.  Very bad health problems and the need for surgery can happen if this drug goes through the uterus. Most of the time, this happens when this drug is put in but can happen at any time during use. The chance is raised if this drug is put in while you are breast-feeding. This drug may also not prevent pregnancy if this happens. If you have questions, talk with your doctor.  This drug may raise the chance of a health problem called pelvic inflammatory disease (PID). The chance may be higher if you or your partner have sex with other partners. PID can lead to other health problems like not being able to get pregnant, surgery, or rarely death. Talk with your doctor.  If you cannot feel the string or you think the IUD has come out, call your doctor. The risk of the IUD coming out may be higher if you have heavy menstrual bleeding or a higher than normal body mass index (BMI). You may get pregnant if this drug comes out. Use another kind of birth control like a condom until you see your doctor. If you think the IUD has come out and you had sex  within the last week, call your doctor. You may be at risk to get pregnant.  Menstrual periods may stop in some people after 1 year of using this drug. Periods will go back to normal when this drug is taken out. If you do not have a period for 6 weeks when this drug is in place, call your doctor.  You may need to have a pregnancy test before this drug is put in. If you have questions, talk with your doctor.  If you think you may be pregnant while this drug is in place, call your doctor right away. Severe and sometimes deadly health problems can happen when this drug is removed or if it needs to be left in place during pregnancy. This includes loss of fertility, infections, and loss of the unborn baby.  Tell your doctor if you are breast-feeding. You will need to talk about any risks to your baby.  What are some side effects that I need to call my doctor about right away?   WARNING/CAUTION: Even though it may be rare, some people may have very bad and sometimes deadly side effects when taking a drug. Tell your doctor or get medical help right away if you have any of the following signs or symptoms that may be related to a very bad side effect:  Signs of an allergic reaction, like rash; hives; itching; red, swollen, blistered, or peeling skin with or without fever; wheezing; tightness in the chest or throat; trouble breathing, swallowing, or talking; unusual hoarseness; or swelling of the mouth, face, lips, tongue, or throat.  Signs of high blood pressure like very bad headache or dizziness, passing out, or change in eyesight.  Weakness on 1 side of the body, trouble speaking or thinking, change in balance, drooping on one side of the face, or blurred eyesight.  Chest pain or pressure.  Stomach pain.  Pelvic pain.  Vaginal bleeding that is not normal.  Vaginal itching or discharge.  Fever or chills.  A lump in the breast or breast soreness.  Painful sex.  Yellow skin or eyes.  Depression or other mood  changes.  Genital sores.  Some pain, bleeding, dizziness, or other reactions may happen when the IUD is put in or taken out. Talk with your doctor right away if these are severe, do not go away within 30 minutes after the IUD is put in, or happen after it is taken out. Talk with your doctor right away if you have other reactions like seizures, slow heartbeat, or passing out.  What are some other side effects of this drug?   All drugs may cause side effects. However, many people have no side effects or only have minor side effects. Call your doctor or get medical help if any of these side effects or any other side effects bother you or do not go away:  Acne or greasy skin.  Headache.  Upset stomach or throwing up.  Painful periods.  Back pain.  Weight gain.  Vaginal bleeding and spotting between menstrual periods may happen, especially during the first 3 to 6 months. Cramping may also happen during the first few weeks. Call your doctor if the bleeding stays heavier than normal or if it gets heavier after it has been light for a while.  These are not all of the side effects that may occur. If you have questions about side effects, call your doctor. Call your doctor for medical advice about side effects.  You may report side effects to your national health agency.  You may report side effects to the FDA at 1-540.701.8676. You may also report side effects at https://www.fda.gov/medwatch.  How is this drug best taken?   Use this drug as ordered by your doctor. Read all information given to you. Follow all instructions closely.  This drug will be given to you by a doctor.  Follow up with the doctor as you have been told.  Check to see if this drug is in place as you have been told by your doctor or read the package insert.  Based on when this drug is put in, you may need to use a non-hormone type of birth control like condoms to prevent pregnancy for some time. Follow what your doctor has told you to do about when to have  this drug put in and using a non-hormone type of birth control.  If this drug is being removed and you do not want to get pregnant, talk with your doctor. You will need to use another kind of birth control like a condom the week before it is removed.  This drug is not for use as emergency birth control. Talk with the doctor.  Tampons or menstrual cups may be used with this drug. Change these with care to avoid pulling the threads of this drug. If you think you may have pulled this drug out of place, use a non-hormone type of birth control like condoms or spermicide or do not have sex, and call your doctor.  What do I do if I miss a dose?   Call your doctor to find out what to do.  How do I store and/or throw out this drug?   If you need to store this drug at home, talk with your doctor, nurse, or pharmacist about how to store it.  General drug facts   If your symptoms or health problems do not get better or if they become worse, call your doctor.  Do not share your drugs with others and do not take anyone else's drugs.  Keep all drugs in a safe place. Keep all drugs out of the reach of children and pets.  Throw away unused or  drugs. Do not flush down a toilet or pour down a drain unless you are told to do so. Check with your pharmacist if you have questions about the best way to throw out drugs. There may be drug take-back programs in your area.  Some drugs may have another patient information leaflet. If you have any questions about this drug, please talk with your doctor, nurse, pharmacist, or other health care provider.  Some drugs may have another patient information leaflet. Check with your pharmacist. If you have any questions about this drug, please talk with your doctor, nurse, pharmacist, or other health care provider.  If you think there has been an overdose, call your poison control center or get medical care right away. Be ready to tell or show what was taken, how much, and when it  happened.  Consumer Information Use and Disclaimer   This generalized information is a limited summary of diagnosis, treatment, and/or medication information. It is not meant to be comprehensive and should be used as a tool to help the user understand and/or assess potential diagnostic and treatment options. It does NOT include all information about conditions, treatments, medications, side effects, or risks that may apply to a specific patient. It is not intended to be medical advice or a substitute for the medical advice, diagnosis, or treatment of a health care provider based on the health care provider's examination and assessment of a patient's specific and unique circumstances. Patients must speak with a health care provider for complete information about their health, medical questions, and treatment options, including any risks or benefits regarding use of medications. This information does not endorse any treatments or medications as safe, effective, or approved for treating a specific patient. Up[employer], Inc[employer] and its affiliates disclaim any warranty or liability relating to this information or the use thereof. The use of this information is governed by the Terms of Use, available at https://www.wolterskluwer.com/en/know/clinical-effectiveness-terms.  Last Reviewed Date   2023-07-26  Copyright   © 2024 [employer]ToDate, Inc[employer] and its affiliates and/or licensors. All rights reserved.

## 2025-05-22 ENCOUNTER — PROCEDURE VISIT (OUTPATIENT)
Age: 50
End: 2025-05-22

## 2025-05-22 VITALS — WEIGHT: 271 LBS | SYSTOLIC BLOOD PRESSURE: 120 MMHG | BODY MASS INDEX: 44.41 KG/M2 | DIASTOLIC BLOOD PRESSURE: 78 MMHG

## 2025-05-22 DIAGNOSIS — Z30.433 ENCOUNTER FOR IUD REMOVAL AND REINSERTION: Primary | ICD-10-CM

## 2025-05-22 DIAGNOSIS — N92.0 MENORRHAGIA WITH REGULAR CYCLE: ICD-10-CM

## 2025-05-22 DIAGNOSIS — Z32.02 NEGATIVE PREGNANCY TEST: ICD-10-CM

## 2025-05-22 DIAGNOSIS — Z12.4 CERVICAL CANCER SCREENING: ICD-10-CM

## 2025-05-22 LAB — SL AMB POCT URINE HCG: NORMAL

## 2025-05-22 PROCEDURE — G0145 SCR C/V CYTO,THINLAYER,RESCR: HCPCS | Performed by: NURSE PRACTITIONER

## 2025-05-22 PROCEDURE — G0476 HPV COMBO ASSAY CA SCREEN: HCPCS | Performed by: NURSE PRACTITIONER

## 2025-05-23 LAB
HPV HR 12 DNA CVX QL NAA+PROBE: NEGATIVE
HPV16 DNA CVX QL NAA+PROBE: NEGATIVE
HPV18 DNA CVX QL NAA+PROBE: NEGATIVE

## 2025-05-28 ENCOUNTER — RESULTS FOLLOW-UP (OUTPATIENT)
Dept: OBGYN CLINIC | Facility: CLINIC | Age: 50
End: 2025-05-28

## 2025-05-28 LAB
LAB AP GYN PRIMARY INTERPRETATION: NORMAL
Lab: NORMAL

## 2025-07-21 ENCOUNTER — TELEPHONE (OUTPATIENT)
Dept: OBGYN CLINIC | Facility: CLINIC | Age: 50
End: 2025-07-21

## 2025-07-21 NOTE — TELEPHONE ENCOUNTER
Left a message notifying Julia that we had to reschedule her appointment with MAGDI Madsen for Thursday, July 24, 2025 @1:00pm.  I rescheduled her appointment to tomorrow, Tuesday, July 22, 2025 @1:45pm.  If patient calls back please confirm she will be able to attend or transfer the call to the [place] [facility] to discuss further.

## 2025-07-22 ENCOUNTER — OFFICE VISIT (OUTPATIENT)
Age: 50
End: 2025-07-22
Payer: COMMERCIAL

## 2025-07-22 VITALS
BODY MASS INDEX: 43.39 KG/M2 | WEIGHT: 270 LBS | DIASTOLIC BLOOD PRESSURE: 82 MMHG | HEIGHT: 66 IN | SYSTOLIC BLOOD PRESSURE: 124 MMHG

## 2025-07-22 DIAGNOSIS — N92.1 BREAKTHROUGH BLEEDING ASSOCIATED WITH INTRAUTERINE DEVICE (IUD): ICD-10-CM

## 2025-07-22 DIAGNOSIS — Z30.431 SURVEILLANCE OF INTRAUTERINE CONTRACEPTION: Primary | ICD-10-CM

## 2025-07-22 DIAGNOSIS — Z97.5 BREAKTHROUGH BLEEDING ASSOCIATED WITH INTRAUTERINE DEVICE (IUD): ICD-10-CM

## 2025-07-22 PROCEDURE — 99213 OFFICE O/P EST LOW 20 MIN: CPT | Performed by: NURSE PRACTITIONER

## 2025-07-22 NOTE — PROGRESS NOTES
"Name: Julia Dunn      : 1975      MRN: [id number]  Encounter Provider: MAGDI Madsen  Encounter Date: 2025   Encounter department: [facility] [place]  :  Assessment & Plan  Surveillance of intrauterine contraception  Mirena placed May 22,2025, just stopped spotting. Had been spotting for 1 month.       Breakthrough bleeding associated with intrauterine device (IUD)  Continue with spotting calendar, call with any issues.  She has an appointment scheduled for pelvic ultrasound repeat in 2025 which she will keep           History of Present Illness   HPI  Julia Dunn is a 49 y.o. female who presents for string check. Mirena replaced from Paragard 25. Spotting daily since 25. It is now light but still present until last night. No cramping or other issues.  Denies pelvic pain or pain with sex.  She does have a follow-up ultrasound scheduled for 2025 to check on the left ovarian lesion.  She also has fibroids.    Past Medical History[1]  Past Surgical History[2]  Current Medications[3]      Review of Systems   Constitutional:  Negative for chills, fatigue, fever and unexpected weight change.   Respiratory:  Negative for shortness of breath.    Gastrointestinal:  Negative for anal bleeding, blood in stool, constipation and diarrhea.   Genitourinary:  Negative for difficulty urinating, dysuria and hematuria.   Neurological:  Negative for weakness.   Psychiatric/Behavioral:  Negative for agitation, behavioral problems and confusion.           Objective   /82 (BP Location: Left arm, Patient Position: Sitting, Cuff Size: Standard)   Ht 5' 5.5\" (1.664 m)   Wt 122 kg (270 lb)   BMI 44.25 kg/m²      Physical Exam  Constitutional:       General: She is not in acute distress.     Appearance: She is well-developed. She is not diaphoretic.   HENT:      Head: Normocephalic and atraumatic.   Pulmonary:      Effort: Pulmonary " effort is normal.   Abdominal:      Palpations: Abdomen is soft.      Hernia: There is no hernia in the left inguinal area.   Genitourinary:     Labia:         Right: No rash, tenderness, lesion or injury.         Left: No rash, tenderness, lesion or injury.       Vagina: No signs of injury and foreign body. No vaginal discharge, erythema, tenderness or bleeding.      Cervix: No cervical motion tenderness, discharge or friability.      Uterus: Not deviated, not fixed and not tender.       Adnexa:         Right: No mass, tenderness or fullness.          Left: No mass, tenderness or fullness.        Comments: IUD STRINGS VISUALIZED, Fibroid Uterus    Musculoskeletal:      Cervical back: Normal range of motion.     Psychiatric:         Behavior: Behavior is cooperative.                  [1] No past medical history on file.  [2]   Past Surgical History:  Procedure Laterality Date    APPENDECTOMY  1993   [3]   Current Outpatient Medications:     Levonorgestrel (MIRENA) 20 MCG/DAY IUD, 1 each by Intrauterine Device route Once every 8 years, Disp: , Rfl: